# Patient Record
Sex: FEMALE | URBAN - METROPOLITAN AREA
[De-identification: names, ages, dates, MRNs, and addresses within clinical notes are randomized per-mention and may not be internally consistent; named-entity substitution may affect disease eponyms.]

---

## 2018-06-26 ENCOUNTER — APPOINTMENT (RX ONLY)
Dept: URBAN - METROPOLITAN AREA CLINIC 86 | Facility: CLINIC | Age: 23
Setting detail: DERMATOLOGY
End: 2018-06-26

## 2018-06-26 DIAGNOSIS — L70.0 ACNE VULGARIS: ICD-10-CM

## 2018-06-26 DIAGNOSIS — L20.89 OTHER ATOPIC DERMATITIS: ICD-10-CM

## 2018-06-26 PROBLEM — J45.909 UNSPECIFIED ASTHMA, UNCOMPLICATED: Status: ACTIVE | Noted: 2018-06-26

## 2018-06-26 PROBLEM — L20.84 INTRINSIC (ALLERGIC) ECZEMA: Status: ACTIVE | Noted: 2018-06-26

## 2018-06-26 PROCEDURE — ? PRESCRIPTION

## 2018-06-26 PROCEDURE — 99202 OFFICE O/P NEW SF 15 MIN: CPT

## 2018-06-26 PROCEDURE — ? ADDITIONAL NOTES

## 2018-06-26 PROCEDURE — ? COUNSELING

## 2018-06-26 RX ORDER — SPIRONOLACTONE 25 MG/1
TABLET, FILM COATED ORAL
Qty: 60 | Refills: 2 | Status: ERX | COMMUNITY
Start: 2018-06-26

## 2018-06-26 RX ORDER — CLINDAMYCIN PHOSPHATE 10 MG/ML
LOTION TOPICAL
Qty: 1 | Refills: 2 | Status: ERX | COMMUNITY
Start: 2018-06-26

## 2018-06-26 RX ORDER — EMOLLIENT COMBINATION NO.53
CREAM (GRAM) TOPICAL
Qty: 1 | Refills: 2 | Status: ERX | COMMUNITY
Start: 2018-06-26

## 2018-06-26 RX ORDER — TRIAMCINOLONE ACETONIDE 1 MG/G
CREAM TOPICAL
Qty: 1 | Refills: 2 | Status: ERX | COMMUNITY
Start: 2018-06-26

## 2018-06-26 RX ADMIN — TRIAMCINOLONE ACETONIDE: 1 CREAM TOPICAL at 15:15

## 2018-06-26 RX ADMIN — Medication: at 15:15

## 2018-06-26 RX ADMIN — CLINDAMYCIN PHOSPHATE: 10 LOTION TOPICAL at 15:14

## 2018-06-26 RX ADMIN — SPIRONOLACTONE: 25 TABLET, FILM COATED ORAL at 15:14

## 2018-06-26 ASSESSMENT — LOCATION ZONE DERM
LOCATION ZONE: ARM
LOCATION ZONE: FACE
LOCATION ZONE: TRUNK
LOCATION ZONE: LEG

## 2018-06-26 ASSESSMENT — LOCATION DETAILED DESCRIPTION DERM
LOCATION DETAILED: STERNAL NOTCH
LOCATION DETAILED: RIGHT POPLITEAL SKIN
LOCATION DETAILED: SUPERIOR THORACIC SPINE
LOCATION DETAILED: LEFT POPLITEAL SKIN
LOCATION DETAILED: RIGHT ELBOW
LOCATION DETAILED: LEFT ELBOW
LOCATION DETAILED: LEFT INFERIOR CENTRAL MALAR CHEEK

## 2018-06-26 ASSESSMENT — LOCATION SIMPLE DESCRIPTION DERM
LOCATION SIMPLE: UPPER BACK
LOCATION SIMPLE: RIGHT ELBOW
LOCATION SIMPLE: CHEST
LOCATION SIMPLE: LEFT CHEEK
LOCATION SIMPLE: RIGHT POPLITEAL SKIN
LOCATION SIMPLE: LEFT POPLITEAL SKIN
LOCATION SIMPLE: LEFT ELBOW

## 2018-06-26 ASSESSMENT — BSA ECZEMA: % BODY COVERED IN ECZEMA: 4

## 2018-06-26 ASSESSMENT — SEVERITY ASSESSMENT: SEVERITY: MILD

## 2018-06-26 NOTE — PROCEDURE: ADDITIONAL NOTES
Additional Notes: 1. Initiate topicals - TAC 0.1% cr sig aaa on extremities, avoid face, axillae, groin, bid x 2wk/mo only and Hylatopic Plus emollient sig apply to skin/body bid.\\n\\n2. Will consider Dupixent if atopic derm not well controlled with topical therapy.\\n\\n3. Counseled on short lukewarm showers applying moisturize to skin/body throughout within 3-4 mins post shower.
Additional Notes: Will consider course of Iso therapy if acne not adaquately controlled with spironolactone 25mg i po bid w/food, clindamycin lotion apply to face, chest, and back bid for acne.

## 2018-06-26 NOTE — HPI: PIMPLES (ACNE)
Is This A New Presentation, Or A Follow-Up?: Acne
Additional Comments (Use Complete Sentences): Patient on OPCs x 3 years. Prior hx of Iso, oral antibiotics and topicals. Reports Iso cleared acne temporarily; oral antibiotics and topicals ineffective. Antibiotics and topicals not known - pt is poor historian. Phx of eczema - not flaring - not on any therapy - topical steroids previously.